# Patient Record
Sex: FEMALE | NOT HISPANIC OR LATINO | URBAN - METROPOLITAN AREA
[De-identification: names, ages, dates, MRNs, and addresses within clinical notes are randomized per-mention and may not be internally consistent; named-entity substitution may affect disease eponyms.]

---

## 2019-05-15 ENCOUNTER — HOSPITAL ENCOUNTER (OUTPATIENT)
Dept: URGENT CARE | Facility: CLINIC | Age: 24
Discharge: HOME | End: 2019-05-15
Attending: FAMILY MEDICINE

## 2019-05-17 LAB — BACTERIA SPEC AEROBE CULT: NORMAL

## 2020-10-27 ENCOUNTER — HOSPITAL ENCOUNTER (OUTPATIENT)
Dept: URGENT CARE | Facility: CLINIC | Age: 25
Discharge: HOME | End: 2020-10-27
Attending: PHYSICIAN ASSISTANT

## 2024-02-06 ENCOUNTER — OFFICE VISIT (OUTPATIENT)
Dept: OBSTETRICS AND GYNECOLOGY | Facility: CLINIC | Age: 29
End: 2024-02-06
Payer: COMMERCIAL

## 2024-02-06 ENCOUNTER — PATIENT ROUNDING (BHMG ONLY) (OUTPATIENT)
Dept: OBSTETRICS AND GYNECOLOGY | Facility: CLINIC | Age: 29
End: 2024-02-06
Payer: COMMERCIAL

## 2024-02-06 VITALS
HEIGHT: 65 IN | SYSTOLIC BLOOD PRESSURE: 130 MMHG | DIASTOLIC BLOOD PRESSURE: 89 MMHG | BODY MASS INDEX: 28.82 KG/M2 | HEART RATE: 72 BPM | WEIGHT: 173 LBS

## 2024-02-06 DIAGNOSIS — Z30.09 BIRTH CONTROL COUNSELING: ICD-10-CM

## 2024-02-06 DIAGNOSIS — Z01.419 WELL WOMAN EXAM: Primary | ICD-10-CM

## 2024-02-06 PROCEDURE — G0123 SCREEN CERV/VAG THIN LAYER: HCPCS

## 2024-02-06 PROCEDURE — 99385 PREV VISIT NEW AGE 18-39: CPT | Performed by: OBSTETRICS & GYNECOLOGY

## 2024-02-06 NOTE — PROGRESS NOTES
"Well Woman Visit    CC: LOLA     HPI:   28 y.o. who presents for a well woman exam.  She reports that her menses is regular.  At times can be heavier.  Last 5 to 7 days.  She was using a ParaGard IUD until recently.  She states that she was concerned that the IUD may have been dislodged which is why she originally scheduled the appointment.  She reports within a week of scheduling her appointment the IUD was expelled.  She had had the IUD in place for 8 years prior to this.  She is interested in having another ParaGard IUD placed.  No other problems or concerns.    History: PMHx, Meds, Allergies, PSHx, Social Hx, and POBHx all reviewed and updated.    /89   Pulse 72   Ht 165.1 cm (65\")   Wt 78.5 kg (173 lb)   LMP 2024 Comment: Moderate flow, lasting about 4 days  Breastfeeding No   BMI 28.79 kg/m²     Physical Exam  Vitals and nursing note reviewed. Exam conducted with a chaperone present.   Constitutional:       General: She is not in acute distress.     Appearance: Normal appearance. She is not ill-appearing.   HENT:      Head: Normocephalic and atraumatic.      Mouth/Throat:      Mouth: Mucous membranes are moist.      Pharynx: Oropharynx is clear.   Neck:      Thyroid: No thyroid mass or thyromegaly.   Chest:   Breasts:     Breasts are symmetrical.      Right: Normal. No swelling, bleeding, inverted nipple, mass, nipple discharge, skin change or tenderness.      Left: Normal. No swelling, bleeding, inverted nipple, mass, nipple discharge, skin change or tenderness.   Abdominal:      General: Abdomen is flat. There is no distension.      Palpations: Abdomen is soft. There is no mass.      Tenderness: There is no abdominal tenderness. There is no guarding or rebound.      Hernia: There is no hernia in the left inguinal area or right inguinal area.   Genitourinary:     General: Normal vulva.      Exam position: Lithotomy position.      Pubic Area: No rash.       Labia:         Right: No " rash, tenderness, lesion or injury.         Left: No rash, tenderness, lesion or injury.       Urethra: No prolapse, urethral pain or urethral lesion.      Vagina: Normal. No vaginal discharge, erythema, tenderness, bleeding or prolapsed vaginal walls.      Cervix: No cervical motion tenderness, lesion, erythema or cervical bleeding.      Uterus: Normal. Not enlarged, not fixed and not tender.       Adnexa:         Right: No mass or tenderness.          Left: No mass or tenderness.        Comments: The patient's IUD strings are not visible  Musculoskeletal:         General: No swelling.      Right lower leg: No edema.      Left lower leg: No edema.   Lymphadenopathy:      Upper Body:      Right upper body: No supraclavicular or axillary adenopathy.      Left upper body: No supraclavicular or axillary adenopathy.   Skin:     General: Skin is warm and dry.   Neurological:      Mental Status: She is alert and oriented to person, place, and time.   Psychiatric:         Mood and Affect: Mood normal.         Behavior: Behavior normal.         Thought Content: Thought content normal.         ASSESSMENT AND PLAN:   Diagnoses and all orders for this visit:    1. Well woman exam (Primary)  Assessment & Plan:  Pap  Recommend daily multivitamin with folic acid    Orders:  -     IGP,rfx Aptima HPV All Pth; Future  -     IGP,rfx Aptima HPV All Pth    2. Birth control counseling  Assessment & Plan:  The risk, benefits, alternatives of    IUD have been discussed.  We have discussed that since she has expelled an IUD previously, she may be at increased risk for further expulsions.  The patient wants to try the ParaGard once again.  I have recommended abstinence at least 2 weeks prior to the insertion and condom use prior to this.  She will need a beta-hCG today prior to insertion.  I have also recommended ibuprofen and/or Tylenol 30 minutes prior to the IUD insertion.    Orders:  -     hCG, Quantitative, Pregnancy;  Future        Counseling:     All BC Options R/B/A/SE/E of each reviewed  OCP/Hormone use risk FRANK  Track menses, RTO IF <q21d, >7d long, or heavy    Preventative:   s/p FLU vaccine this season  Recommend COVID vaccine, R/B discussed    She understands the importance of having any ordered tests to be performed in a timely fashion.  The risks of not performing them include, but are not limited to, advanced cancer stages, bone loss from osteoporosis and/or subsequent increase in morbidity and/or mortality.  She is encouraged to review her results online and/or contact or office if she has questions.     Follow Up:  Return for paragard IUD insert.    Pedrito Corea MD  02/06/2024

## 2024-02-06 NOTE — ASSESSMENT & PLAN NOTE
The risk, benefits, alternatives of    IUD have been discussed.  We have discussed that since she has expelled an IUD previously, she may be at increased risk for further expulsions.  The patient wants to try the ParaGard once again.  I have recommended abstinence at least 2 weeks prior to the insertion and condom use prior to this.  She will need a beta-hCG today prior to insertion.  I have also recommended ibuprofen and/or Tylenol 30 minutes prior to the IUD insertion.

## 2024-02-09 LAB
CONV .: NORMAL
CYTOLOGIST CVX/VAG CYTO: NORMAL
CYTOLOGY CVX/VAG DOC CYTO: NORMAL
CYTOLOGY CVX/VAG DOC THIN PREP: NORMAL
DX ICD CODE: NORMAL
HIV 1 & 2 AB SER-IMP: NORMAL
OTHER STN SPEC: NORMAL
STAT OF ADQ CVX/VAG CYTO-IMP: NORMAL

## 2024-02-12 ENCOUNTER — TELEPHONE (OUTPATIENT)
Dept: OBSTETRICS AND GYNECOLOGY | Facility: CLINIC | Age: 29
End: 2024-02-12

## 2024-02-12 NOTE — TELEPHONE ENCOUNTER
Caller: Hawa Henley    Relationship to patient: Self    Best call back number: [phone]    Patient is needing: PATIENT CALLED TO CHECK STATUS OF INSURANCE APPROVAL FOR PARAGAURD

## 2024-02-12 NOTE — TELEPHONE ENCOUNTER
Spoke with patient and she is aware that it can take up to 2 weeks for us to hear back from [place]. If she does not hear from us after the first of next week she will call the office to check on status again.

## 2024-02-28 NOTE — TELEPHONE ENCOUNTER
Penny has not received a determination yet from [facility] for this pt's Nexplanon pre-certification.  To my knowledge, she has re-faxed the request today.  The patient wants to know if you can give her an Rx for an OCP until we are able to get her scheduled for this.  Please advise. Thanks.

## 2024-02-28 NOTE — TELEPHONE ENCOUNTER
Provider: DR MATIAS    Caller: APRIL [facility]    Pharmacy: [facility] - CONFIRMED    Phone Number: [phone] / LVM    Reason for Call: PT CALLED TO CHECK STATUS OF THE PARAGAURD - STILL NO APPROVAL - PT IS WANTING TO KNOW IF SHE CAN BE PUT ON B/C PILL UNTIL THE APPROVAL     PLEASE CALL THE PT TO LET HER KNOW    THANK YOU!

## 2024-03-04 ENCOUNTER — TELEPHONE (OUTPATIENT)
Dept: OBSTETRICS AND GYNECOLOGY | Facility: CLINIC | Age: 29
End: 2024-03-04
Payer: COMMERCIAL

## 2024-04-02 ENCOUNTER — LAB (OUTPATIENT)
Dept: OBSTETRICS AND GYNECOLOGY | Facility: CLINIC | Age: 29
End: 2024-04-02
Payer: COMMERCIAL

## 2024-04-02 DIAGNOSIS — Z30.09 BIRTH CONTROL COUNSELING: ICD-10-CM

## 2024-04-02 LAB — HCG INTACT+B SERPL-ACNC: <1 MIU/ML

## 2024-04-02 PROCEDURE — 84702 CHORIONIC GONADOTROPIN TEST: CPT | Performed by: OBSTETRICS & GYNECOLOGY

## 2024-04-02 RX ORDER — COPPER 313.4 MG/1
INTRAUTERINE DEVICE INTRAUTERINE ONCE
Status: CANCELLED | OUTPATIENT
Start: 2024-04-03

## 2024-04-03 ENCOUNTER — PROCEDURE VISIT (OUTPATIENT)
Dept: OBSTETRICS AND GYNECOLOGY | Facility: CLINIC | Age: 29
End: 2024-04-03
Payer: COMMERCIAL

## 2024-04-03 VITALS
DIASTOLIC BLOOD PRESSURE: 93 MMHG | BODY MASS INDEX: 28.82 KG/M2 | WEIGHT: 173 LBS | HEIGHT: 65 IN | HEART RATE: 73 BPM | SYSTOLIC BLOOD PRESSURE: 137 MMHG

## 2024-04-03 DIAGNOSIS — Z30.430 ENCOUNTER FOR IUD INSERTION: Primary | ICD-10-CM

## 2024-04-03 RX ORDER — COPPER 313.4 MG/1
1 INTRAUTERINE DEVICE INTRAUTERINE ONCE
Status: COMPLETED | OUTPATIENT
Start: 2024-04-03 | End: 2024-04-03

## 2024-04-03 RX ORDER — COPPER 313.4 MG/1
1 INTRAUTERINE DEVICE INTRAUTERINE ONCE
COMMUNITY
Start: 2024-02-28 | End: 2024-04-03

## 2024-04-03 RX ADMIN — COPPER 1 EACH: 313.4 INTRAUTERINE DEVICE INTRAUTERINE at 10:09

## 2024-04-03 NOTE — PROGRESS NOTES
Procedures  IUD Insertion Procedure Note    Indication: Desires long acting reversible contraception     Procedure Details   Serum pregnancy test was done and was NEGATIVE .  The risks (including infection, bleeding, pain, and uterine perforation) and benefits of the procedure were explained to the patient and Written informed consent was obtained.      Cervix cleansed with Betadine. Uterus sounded to 9 cm. IUD inserted without difficulty. String visible and trimmed.    IUD Information:  ParaGard.    Condition:  Stable    Complications:  None    Patient tolerated the procedure well without complications.    Plan:  The patient was advised to call for any fever or for prolonged or severe pain or bleeding. She was advised to use OTC acetaminophen and OTC ibuprofen as needed for mild to moderate pain.     Pedrito Corea MD  4/3/2024  10:00 EDT

## 2024-05-15 ENCOUNTER — OFFICE VISIT (OUTPATIENT)
Dept: OBSTETRICS AND GYNECOLOGY | Facility: CLINIC | Age: 29
End: 2024-05-15
Payer: COMMERCIAL

## 2024-05-15 VITALS
WEIGHT: 176 LBS | HEART RATE: 79 BPM | BODY MASS INDEX: 29.32 KG/M2 | SYSTOLIC BLOOD PRESSURE: 136 MMHG | HEIGHT: 65 IN | DIASTOLIC BLOOD PRESSURE: 78 MMHG

## 2024-05-15 DIAGNOSIS — Z30.431 IUD CHECK UP: Primary | ICD-10-CM

## 2024-05-15 PROBLEM — Z01.419 WELL WOMAN EXAM: Status: RESOLVED | Noted: 2024-02-06 | Resolved: 2024-05-15

## 2024-05-15 PROBLEM — Z97.5 IUD CONTRACEPTION: Status: ACTIVE | Noted: 2024-05-15

## 2024-05-15 PROBLEM — Z30.09 BIRTH CONTROL COUNSELING: Status: RESOLVED | Noted: 2024-02-06 | Resolved: 2024-05-15

## 2024-05-15 PROCEDURE — 99213 OFFICE O/P EST LOW 20 MIN: CPT | Performed by: OBSTETRICS & GYNECOLOGY

## 2024-05-15 NOTE — ASSESSMENT & PLAN NOTE
Retained ParaGard IUD with appropriate IUD string length.  Continue IUD use.  Return for any concerns such as heavy bleeding, irregular bleeding, or pelvic pain.  Recommend daily prenatal vitamin with folic acid  Recommend OTC Tylenol and or ibuprofen for any further pelvic cramping related to IUD insertion.

## 2024-05-15 NOTE — PROGRESS NOTES
"[facility]  Post IUD Placement Visit    CC: IUD Check Up    Subjective:  Pelvic pain/cramping:  No  Vaginal bleeding:  No  Pain with sex: No  Feels strings easily:  Not tried  No problems or concerns    Objective: /78   Pulse 79   Ht 165.1 cm (65\")   Wt 79.8 kg (176 lb)   LMP 04/12/2024   Breastfeeding No   BMI 29.29 kg/m²     Physical Exam  Vitals and nursing note reviewed. Exam conducted with a chaperone present.   Constitutional:       General: She is not in acute distress.     Appearance: Normal appearance. She is not ill-appearing.   HENT:      Head: Normocephalic and atraumatic.   Genitourinary:     General: Normal vulva.      Exam position: Lithotomy position.      Labia:         Right: No rash, tenderness or lesion.         Left: No rash, tenderness or lesion.       Vagina: No signs of injury and foreign body. No vaginal discharge, erythema, tenderness, bleeding or prolapsed vaginal walls.      Cervix: No cervical motion tenderness, discharge, friability, lesion, erythema or cervical bleeding.      Uterus: Not deviated, not enlarged, not fixed and not tender.       Comments: The IUD string was 2 cm in length  Musculoskeletal:         General: No swelling.      Right lower leg: No edema.      Left lower leg: No edema.   Skin:     General: Skin is warm and dry.      Findings: No rash.   Neurological:      Mental Status: She is alert and oriented to person, place, and time.   Psychiatric:         Mood and Affect: Mood normal.         Behavior: Behavior normal.         Thought Content: Thought content normal.         Judgment: Judgment normal.       Assessment and Plan:    Diagnoses and all orders for this visit:    1. IUD check up (Primary)  Assessment & Plan:  Retained ParaGard IUD with appropriate IUD string length.  Continue IUD use.  Return for any concerns such as heavy bleeding, irregular bleeding, or pelvic pain.  Recommend daily prenatal vitamin with folic acid  Recommend " OTC Tylenol and or ibuprofen for any further pelvic cramping related to IUD insertion.          Counseling:  Pt was counseled to perform monthly string checks. Keep track of menses.    RTO if <q21d, >7d long, or heavy or if positive pregnancy test.    Follow Up:  Return for Annual physical.    Pedrito Corea MD  05/15/2024

## 2025-05-27 ENCOUNTER — HOSPITAL ENCOUNTER (EMERGENCY)
Facility: HOSPITAL | Age: 30
Discharge: HOME | End: 2025-05-27
Attending: EMERGENCY MEDICINE

## 2025-05-27 ENCOUNTER — APPOINTMENT (OUTPATIENT)
Dept: CT IMAGING | Facility: HOSPITAL | Age: 30
End: 2025-05-27

## 2025-05-27 VITALS
SYSTOLIC BLOOD PRESSURE: 124 MMHG | RESPIRATION RATE: 20 BRPM | DIASTOLIC BLOOD PRESSURE: 68 MMHG | WEIGHT: 192.24 LBS | OXYGEN SATURATION: 97 % | BODY MASS INDEX: 32.03 KG/M2 | HEIGHT: 65 IN | HEART RATE: 78 BPM | TEMPERATURE: 97.9 F

## 2025-05-27 DIAGNOSIS — L05.01 PILONIDAL CYST WITH ABSCESS: Primary | ICD-10-CM

## 2025-05-27 LAB
BASOPHILS # BLD AUTO: 0.07 10*3/MM3 (ref 0–0.2)
BASOPHILS NFR BLD AUTO: 0.6 % (ref 0–1.5)
DEPRECATED RDW RBC AUTO: 41.5 FL (ref 37–54)
EOSINOPHIL # BLD AUTO: 0.1 10*3/MM3 (ref 0–0.4)
EOSINOPHIL NFR BLD AUTO: 0.8 % (ref 0.3–6.2)
ERYTHROCYTE [DISTWIDTH] IN BLOOD BY AUTOMATED COUNT: 12.6 % (ref 12.3–15.4)
HCG INTACT+B SERPL-ACNC: <0.5 MIU/ML
HCT VFR BLD AUTO: 40.6 % (ref 34–46.6)
HGB BLD-MCNC: 13.4 G/DL (ref 12–15.9)
HOLD SPECIMEN: NORMAL
HOLD SPECIMEN: NORMAL
IMM GRANULOCYTES # BLD AUTO: 0.05 10*3/MM3 (ref 0–0.05)
IMM GRANULOCYTES NFR BLD AUTO: 0.4 % (ref 0–0.5)
LYMPHOCYTES # BLD AUTO: 1.87 10*3/MM3 (ref 0.7–3.1)
LYMPHOCYTES NFR BLD AUTO: 15.1 % (ref 19.6–45.3)
MCH RBC QN AUTO: 29.6 PG (ref 26.6–33)
MCHC RBC AUTO-ENTMCNC: 33 G/DL (ref 31.5–35.7)
MCV RBC AUTO: 89.6 FL (ref 79–97)
MONOCYTES # BLD AUTO: 0.91 10*3/MM3 (ref 0.1–0.9)
MONOCYTES NFR BLD AUTO: 7.3 % (ref 5–12)
NEUTROPHILS NFR BLD AUTO: 75.8 % (ref 42.7–76)
NEUTROPHILS NFR BLD AUTO: 9.4 10*3/MM3 (ref 1.7–7)
NRBC BLD AUTO-RTO: 0 /100 WBC (ref 0–0.2)
PLATELET # BLD AUTO: 290 10*3/MM3 (ref 140–450)
PMV BLD AUTO: 9.8 FL (ref 6–12)
RBC # BLD AUTO: 4.53 10*6/MM3 (ref 3.77–5.28)
WBC NRBC COR # BLD AUTO: 12.4 10*3/MM3 (ref 3.4–10.8)

## 2025-05-27 PROCEDURE — 99285 EMERGENCY DEPT VISIT HI MDM: CPT

## 2025-05-27 PROCEDURE — 84702 CHORIONIC GONADOTROPIN TEST: CPT | Performed by: EMERGENCY MEDICINE

## 2025-05-27 PROCEDURE — 25010000002 LIDOCAINE 1 % SOLUTION

## 2025-05-27 PROCEDURE — 25510000001 IOPAMIDOL PER 1 ML: Performed by: EMERGENCY MEDICINE

## 2025-05-27 PROCEDURE — 87205 SMEAR GRAM STAIN: CPT

## 2025-05-27 PROCEDURE — 25010000002 HYDROMORPHONE 1 MG/ML SOLUTION

## 2025-05-27 PROCEDURE — 85025 COMPLETE CBC W/AUTO DIFF WBC: CPT

## 2025-05-27 PROCEDURE — 96374 THER/PROPH/DIAG INJ IV PUSH: CPT

## 2025-05-27 PROCEDURE — 72193 CT PELVIS W/DYE: CPT

## 2025-05-27 PROCEDURE — 87070 CULTURE OTHR SPECIMN AEROBIC: CPT

## 2025-05-27 RX ORDER — HYDROCODONE BITARTRATE AND ACETAMINOPHEN 7.5; 325 MG/1; MG/1
1 TABLET ORAL EVERY 6 HOURS PRN
Qty: 12 TABLET | Refills: 0 | Status: SHIPPED | OUTPATIENT
Start: 2025-05-27

## 2025-05-27 RX ORDER — LIDOCAINE HYDROCHLORIDE 10 MG/ML
10 INJECTION, SOLUTION INFILTRATION; PERINEURAL ONCE
Status: COMPLETED | OUTPATIENT
Start: 2025-05-27 | End: 2025-05-27

## 2025-05-27 RX ORDER — HYDROCODONE BITARTRATE AND ACETAMINOPHEN 7.5; 325 MG/1; MG/1
1 TABLET ORAL ONCE
Refills: 0 | Status: COMPLETED | OUTPATIENT
Start: 2025-05-27 | End: 2025-05-27

## 2025-05-27 RX ORDER — MUPIROCIN 20 MG/G
1 OINTMENT TOPICAL 3 TIMES DAILY
Qty: 21 G | Refills: 0 | Status: SHIPPED | OUTPATIENT
Start: 2025-05-27 | End: 2025-06-03

## 2025-05-27 RX ORDER — IOPAMIDOL 755 MG/ML
100 INJECTION, SOLUTION INTRAVASCULAR
Status: COMPLETED | OUTPATIENT
Start: 2025-05-27 | End: 2025-05-27

## 2025-05-27 RX ORDER — SULFAMETHOXAZOLE AND TRIMETHOPRIM 800; 160 MG/1; MG/1
1 TABLET ORAL 2 TIMES DAILY
Qty: 14 TABLET | Refills: 0 | Status: SHIPPED | OUTPATIENT
Start: 2025-05-27 | End: 2025-06-03

## 2025-05-27 RX ADMIN — HYDROCODONE BITARTRATE AND ACETAMINOPHEN 1 TABLET: 7.5; 325 TABLET ORAL at 13:50

## 2025-05-27 RX ADMIN — IOPAMIDOL 95 ML: 755 INJECTION, SOLUTION INTRAVENOUS at 15:01

## 2025-05-27 RX ADMIN — CEPHALEXIN 500 MG: 250 CAPSULE ORAL at 13:51

## 2025-05-27 RX ADMIN — HYDROMORPHONE HYDROCHLORIDE 1 MG: 1 INJECTION, SOLUTION INTRAMUSCULAR; INTRAVENOUS; SUBCUTANEOUS at 16:13

## 2025-05-27 RX ADMIN — LIDOCAINE HYDROCHLORIDE 10 ML: 10 INJECTION, SOLUTION INFILTRATION; PERINEURAL at 13:51

## 2025-05-27 NOTE — ED PROVIDER NOTES
Time: 4:39 PM EDT  Date of encounter:  2025  Independent Historian/Clinical History and Information was obtained by:   Patient    History is limited by: N/A    Chief Complaint: Abscess      History of Present Illness:  Patient is a 30 y.o. year old female who presents to the emergency department for evaluation of abscess to tailbone.  Patient reports she has a cyst or abscess over her tailbone region that started 3 days ago. Patient has a history of Pilonidal cysts.       Patient Care Team  Primary Care Provider: Provider, Maricel Known    Past Medical History:     No Known Allergies  Past Medical History:   Diagnosis Date    Cyst near tailbone      Past Surgical History:   Procedure Laterality Date     SECTION  2016     Family History   Problem Relation Age of Onset    Ovarian cancer Neg Hx     Uterine cancer Neg Hx     Breast cancer Neg Hx     Prostate cancer Neg Hx     Colon cancer Neg Hx     Clotting disorder Neg Hx     Deep vein thrombosis Neg Hx     Pulmonary embolism Neg Hx        Home Medications:  Prior to Admission medications    Medication Sig Start Date End Date Taking? Authorizing Provider   mupirocin (BACTROBAN) 2 % ointment Apply 1 Application topically to the appropriate area as directed 3 (Three) Times a Day for 7 days. 5/27/25 6/3/25  Natalia Kelley APRN   ondansetron ODT (ZOFRAN-ODT) 4 MG disintegrating tablet Place 1 tablet on the tongue 4 (Four) Times a Day As Needed for Nausea. 3/4/25   Kike Minor MD   PARAGARD INTRAUTERINE COPPER IU by Intrauterine route.    Provider, MD Aniya   sulfamethoxazole-trimethoprim (BACTRIM DS,SEPTRA DS) 800-160 MG per tablet Take 1 tablet by mouth 2 (Two) Times a Day for 7 days. 5/27/25 6/3/25  Natalia Kelley APRN        Social History:   Social History     Tobacco Use    Smoking status: Never     Passive exposure: Never    Smokeless tobacco: Never   Vaping Use    Vaping status: Every Day    Substances: Nicotine, Flavoring    Devices:  "Disposable   Substance Use Topics    Alcohol use: Yes     Comment: OCC    Drug use: Never         Review of Systems:  Review of Systems   Constitutional:  Negative for chills and fever.   HENT:  Negative for congestion, rhinorrhea and sore throat.    Eyes:  Negative for pain and visual disturbance.   Respiratory:  Negative for apnea, cough, chest tightness and shortness of breath.    Cardiovascular:  Negative for chest pain and palpitations.   Gastrointestinal:  Negative for abdominal pain, diarrhea, nausea and vomiting.   Genitourinary:  Negative for difficulty urinating and dysuria.   Musculoskeletal:  Negative for joint swelling and myalgias.   Skin:  Positive for color change.   Neurological:  Negative for seizures and headaches.   Psychiatric/Behavioral: Negative.     All other systems reviewed and are negative.       Physical Exam:  /98 (BP Location: Right arm, Patient Position: Sitting)   Pulse 116   Temp 98.6 °F (37 °C) (Oral)   Resp 19   Ht 165.1 cm (65\")   Wt 87.2 kg (192 lb 3.9 oz)   LMP  (LMP Unknown)   SpO2 100%   BMI 31.99 kg/m²     Physical Exam  Vitals and nursing note reviewed.   Constitutional:       General: She is not in acute distress.     Appearance: Normal appearance. She is not toxic-appearing.   HENT:      Head: Normocephalic and atraumatic.      Jaw: There is normal jaw occlusion.   Eyes:      General: Lids are normal.      Extraocular Movements: Extraocular movements intact.      Conjunctiva/sclera: Conjunctivae normal.      Pupils: Pupils are equal, round, and reactive to light.   Cardiovascular:      Rate and Rhythm: Normal rate and regular rhythm.      Pulses: Normal pulses.      Heart sounds: Normal heart sounds.   Pulmonary:      Effort: Pulmonary effort is normal. No respiratory distress.      Breath sounds: Normal breath sounds. No wheezing or rhonchi.   Abdominal:      General: Abdomen is flat.      Palpations: Abdomen is soft.      Tenderness: There is no abdominal " tenderness. There is no guarding or rebound.   Musculoskeletal:         General: Normal range of motion.      Cervical back: Normal range of motion and neck supple.      Right lower leg: No edema.      Left lower leg: No edema.   Skin:     General: Skin is warm and dry.      Findings: Erythema present.          Neurological:      Mental Status: She is alert and oriented to person, place, and time. Mental status is at baseline.   Psychiatric:         Mood and Affect: Mood normal.                    Medical Decision Making:      Comorbidities that affect care:    None    External Notes reviewed:    None      The following orders were placed and all results were independently analyzed by me:  Orders Placed This Encounter   Procedures    Incision & Drainage    Wound Culture - Wound, Buttock    CT Pelvis With Contrast    CBC Auto Differential    hCG, Quantitative, Pregnancy    I and D tray, wound CX  Nursing Communication    CBC & Differential    Extra Tubes    Green Top (Gel)    Suggs Top       Medications Given in the Emergency Department:  Medications   lidocaine (XYLOCAINE) 1 % injection 10 mL (10 mL Infiltration Given 5/27/25 1351)   cephalexin (KEFLEX) capsule 500 mg (500 mg Oral Given 5/27/25 1351)   HYDROcodone-acetaminophen (NORCO) 7.5-325 MG per tablet 1 tablet (1 tablet Oral Given 5/27/25 1350)   iopamidol (ISOVUE-370) 76 % injection 100 mL (95 mL Intravenous Given 5/27/25 1501)   HYDROmorphone (DILAUDID) injection 1 mg (1 mg Intravenous Given 5/27/25 1613)        ED Course:         Labs:    Lab Results (last 24 hours)       Procedure Component Value Units Date/Time    CBC & Differential [[id number]]  (Abnormal) Collected: 05/27/25 1421    Specimen: Blood Updated: 05/27/25 1431    Narrative:      The following orders were created for panel order CBC & Differential.  Procedure                               Abnormality         Status                     ---------                               -----------          ------                     CBC Auto Differential[[id number]]        Abnormal            Final result                 Please view results for these tests on the individual orders.    CBC Auto Differential [[id number]]  (Abnormal) Collected: 05/27/25 1421    Specimen: Blood Updated: 05/27/25 1431     WBC 12.40 10*3/mm3      RBC 4.53 10*6/mm3      Hemoglobin 13.4 g/dL      Hematocrit 40.6 %      MCV 89.6 fL      MCH 29.6 pg      MCHC 33.0 g/dL      RDW 12.6 %      RDW-SD 41.5 fl      MPV 9.8 fL      Platelets 290 10*3/mm3      Neutrophil % 75.8 %      Lymphocyte % 15.1 %      Monocyte % 7.3 %      Eosinophil % 0.8 %      Basophil % 0.6 %      Immature Grans % 0.4 %      Neutrophils, Absolute 9.40 10*3/mm3      Lymphocytes, Absolute 1.87 10*3/mm3      Monocytes, Absolute 0.91 10*3/mm3      Eosinophils, Absolute 0.10 10*3/mm3      Basophils, Absolute 0.07 10*3/mm3      Immature Grans, Absolute 0.05 10*3/mm3      nRBC 0.0 /100 WBC     hCG, Quantitative, Pregnancy [[id number]] Collected: 05/27/25 1421    Specimen: Blood Updated: 05/27/25 1446     HCG Quantitative <0.50 mIU/mL     Narrative:      HCG Ranges by Gestational Age    Females - non-pregnant premenopausal   </= 1mIU/mL HCG  Females - postmenopausal               </= 7mIU/mL HCG    3 Weeks       5.4   -      72 mIU/mL  4 Weeks      10.2   -     708 mIU/mL  5 Weeks       217   -   8,245 mIU/mL  6 Weeks       152   -  32,177 mIU/mL  7 Weeks     4,059   - 153,767 mIU/mL  8 Weeks    31,366   - 149,094 mIU/mL  9 Weeks    59,109   - 135,901 mIU/mL  10 Weeks   44,186   - 170,409 mIU/mL  12 Weeks   27,107   - 201,615 mIU/mL  14 Weeks   24,302   -  93,646 mIU/mL  15 Weeks   12,540   -  69,747 mIU/mL  16 Weeks    8,904   -  55,332 mIU/mL  17 Weeks    8,240   -  51,793 mIU/mL  18 Weeks    9,649   -  55,271 mIU/mL               Imaging:    CT Pelvis With Contrast  Result Date: 5/27/2025  CT PELVIS W CONTRAST Date of Exam: 5/27/2025 3:00 PM EDT Indication: Pilonidal cyst.  Comparison: None available. Technique: Axial CT images were obtained of the pelvis after the uneventful intravenous administration of iodinated contrast.  Reconstructed coronal and sagittal images were also obtained. Automated exposure control and iterative construction methods were used. Findings: There is a rim-enhancing fluid collection located along the upper buttock crease measuring 1.9 x 1.5 cm in diameter on image 49 of series 2. It measures approximately 2.4 cm in craniocaudal height on image 89 of series 4. The wall is thickened and there is enhancement in the surrounding subcutaneous fat raising suspicion of a infected pilonidal cyst. There is no extension of the inflammatory changes into the ischial anal fat. There is an intrauterine device in the pelvis which is in good position. The urinary bladder is unremarkable. There are no enlarged pelvic lymph nodes. There is a tiny fat density umbilical hernia. There is normal vascular enhancement. There are no suspicious osteolytic or sclerotic lesions within the bony structures.     Impression: 1.There is a rim-enhancing fluid collection located along the upper buttock crease measuring 1.9 x 1.5 cm in diameter. The wall is thickened and there is enhancement in the surrounding subcutaneous fat raising suspicion of a infected pilonidal cyst. 2.Tiny fat density umbilical hernia. Electronically Signed: Khoa Stephens MD  5/27/2025 3:25 PM EDT  Workstation ID: [id number]        Differential Diagnosis and Discussion:    Abscess: Differential diagnosis for an abscess includes but is not limited to bacterial or fungal infections, foreign body reactions, malignancies, and autoimmune or inflammatory conditions.    PROCEDURES:    Labs were collected in the emergency department and all labs were reviewed and interpreted by me.  CT scan was performed in the emergency department and the CT scan radiology impression was interpreted by me.    No orders to display       Incision &  Drainage    Date/Time: 5/27/2025 4:42 PM    Performed by: Natalia Kelley APRN  Authorized by: Nikolas Andrea MD    Consent:     Consent obtained:  Verbal    Consent given by:  Patient    Risks, benefits, and alternatives were discussed: yes      Risks discussed:  Bleeding, incomplete drainage, infection, damage to other organs and pain    Alternatives discussed:  No treatment  Universal protocol:     Procedure explained and questions answered to patient or proxy's satisfaction: yes      Relevant documents present and verified: yes      Test results available : yes      Imaging studies available: yes      Required blood products, implants, devices, and special equipment available: yes      Site/side marked: yes      Immediately prior to procedure, a time out was called: yes      Patient identity confirmed:  Verbally with patient, arm band, provided demographic data and hospital-assigned identification number  Location:     Type:  Pilonidal cyst    Location:  Lower extremity    Lower extremity location:  Buttock    Buttock location:  R buttock  Pre-procedure details:     Skin preparation:  Chlorhexidine  Sedation:     Sedation type:  None  Anesthesia:     Anesthesia method:  Local infiltration    Local anesthetic:  Lidocaine 1% w/o epi  Procedure details:     Incision types:  Stab incision    Incision depth:  Subcutaneous    Wound management:  Probed and deloculated and irrigated with saline    Drainage:  Serosanguinous, purulent and bloody    Drainage amount:  Copious    Wound treatment:  Wound left open    Packing materials:  None  Post-procedure details:     Procedure completion:  Tolerated well, no immediate complications      MDM     Amount and/or Complexity of Data Reviewed  Clinical lab tests: reviewed  Tests in the radiology section of CPT®: reviewed                       Patient Care Considerations:          Consultants/Shared Management Plan:    SHARED VISIT: I have discussed the case with my supervising  physician, Dr. Andrea who states he agrees with the current plan of care. The substantive portion of the medical decision was made by the attesting physician who made or approve the management plan and will take responsibility for the patient.  Clinical findings were discussed and ultimate disposition was made in consult with supervising physician.    Social Determinants of Health:    Patient is independent, reliable, and has access to care.       Disposition and Care Coordination:    Discharged: The patient is suitable and stable for discharge with no need for consideration of admission.    I have explained the patient´s condition, diagnoses and treatment plan based on the information available to me at this time. I have answered questions and addressed any concerns. The patient has a good  understanding of the patient´s diagnosis, condition, and treatment plan as can be expected at this point. The vital signs have been stable. The patient´s condition is stable and appropriate for discharge from the emergency department.      The patient will pursue further outpatient evaluation with the primary care physician or other designated or consulting physician as outlined in the discharge instructions. They are agreeable to this plan of care and follow-up instructions have been explained in detail. The patient has received these instructions in written format and has expressed an understanding of the discharge instructions. The patient is aware that any significant change in condition or worsening of symptoms should prompt an immediate return to this or the closest emergency department or call to 911.    Final diagnoses:   Pilonidal cyst with abscess        ED Disposition       ED Disposition   Discharge    Condition   Stable    Comment   --               This medical record created using voice recognition software.             Natalia Kelley, APRN  05/27/25 1642       Natalia Kelley, APRN  05/27/25 1645

## 2025-05-27 NOTE — ED PROVIDER NOTES
"SHARED VISIT ATTESTATION:    This visit was performed by myself and an APC.  I personally approved the management plan/medical decision making and take responsibility for the patient management.      SHARED VISIT NOTE:    Patient is 30 y.o. year old female that presents to the ED for evaluation of cyst.     Physical Exam    ED Course:    /98 (BP Location: Right arm, Patient Position: Sitting)   Pulse 116   Temp 98.6 °F (37 °C) (Oral)   Resp 19   Ht 165.1 cm (65\")   Wt 87.2 kg (192 lb 3.9 oz)   SpO2 100%   BMI 31.99 kg/m²       The following orders were placed and all results were independently analyzed by me:  Orders Placed This Encounter   Procedures    Wound Culture - Wound, Buttock    I and D tray, wound CX  Nursing Communication       Medications Given in the Emergency Department:  Medications   lidocaine (XYLOCAINE) 1 % injection 10 mL (has no administration in time range)        ED Course:         Labs:    Lab Results (last 24 hours)       ** No results found for the last 24 hours. **             Imaging:    No Radiology Exams Resulted Within Past 24 Hours    MDM:    Procedures                         Nikolas Andrea MD  13:40 EDT  05/27/25         Nikolas Andrea MD  05/27/25 1828    "

## 2025-05-27 NOTE — Clinical Note
[facility] EMERGENCY ROOM  [address] [place] [address]  Phone: [phone]  Fax: [phone]    April Level was seen and treated in our emergency department on 5/27/2025.  She may return to work on 05/30/2025.         Thank you for choosing [facility].    Natalia Kelley, APRN

## 2025-05-27 NOTE — Clinical Note
[facility] EMERGENCY ROOM  [address] [place] [address]  Phone: [phone]  Fax: [phone]    April Level was seen and treated in our emergency department on 5/27/2025.  She may return to work on 05/30/2025.         Thank you for choosing [facility].    Natalia Kelley, APRN

## 2025-05-29 LAB
BACTERIA SPEC AEROBE CULT: NORMAL
GRAM STN SPEC: NORMAL

## 2025-08-15 ENCOUNTER — HOSPITAL ENCOUNTER (EMERGENCY)
Facility: HOSPITAL | Age: 30
Discharge: HOME | End: 2025-08-15
Attending: EMERGENCY MEDICINE
Payer: MEDICAID

## 2025-08-15 VITALS
HEIGHT: 65 IN | TEMPERATURE: 99 F | DIASTOLIC BLOOD PRESSURE: 99 MMHG | HEART RATE: 94 BPM | SYSTOLIC BLOOD PRESSURE: 142 MMHG | RESPIRATION RATE: 18 BRPM | BODY MASS INDEX: 32.76 KG/M2 | OXYGEN SATURATION: 99 % | WEIGHT: 196.65 LBS

## 2025-08-15 DIAGNOSIS — K61.0 PERIANAL CELLULITIS: ICD-10-CM

## 2025-08-15 DIAGNOSIS — L05.91 PILONIDAL CYST: Primary | ICD-10-CM

## 2025-08-15 LAB
BASOPHILS # BLD AUTO: 0.08 10*3/MM3 (ref 0–0.2)
BASOPHILS NFR BLD AUTO: 0.7 % (ref 0–1.5)
DEPRECATED RDW RBC AUTO: 42.2 FL (ref 37–54)
EOSINOPHIL # BLD AUTO: 0.09 10*3/MM3 (ref 0–0.4)
EOSINOPHIL NFR BLD AUTO: 0.8 % (ref 0.3–6.2)
ERYTHROCYTE [DISTWIDTH] IN BLOOD BY AUTOMATED COUNT: 12.9 % (ref 12.3–15.4)
HCT VFR BLD AUTO: 40.1 % (ref 34–46.6)
HGB BLD-MCNC: 13.7 G/DL (ref 12–15.9)
IMM GRANULOCYTES # BLD AUTO: 0.04 10*3/MM3 (ref 0–0.05)
IMM GRANULOCYTES NFR BLD AUTO: 0.4 % (ref 0–0.5)
LYMPHOCYTES # BLD AUTO: 2.08 10*3/MM3 (ref 0.7–3.1)
LYMPHOCYTES NFR BLD AUTO: 18.5 % (ref 19.6–45.3)
MCH RBC QN AUTO: 30.6 PG (ref 26.6–33)
MCHC RBC AUTO-ENTMCNC: 34.2 G/DL (ref 31.5–35.7)
MCV RBC AUTO: 89.5 FL (ref 79–97)
MONOCYTES # BLD AUTO: 0.71 10*3/MM3 (ref 0.1–0.9)
MONOCYTES NFR BLD AUTO: 6.3 % (ref 5–12)
NEUTROPHILS NFR BLD AUTO: 73.3 % (ref 42.7–76)
NEUTROPHILS NFR BLD AUTO: 8.22 10*3/MM3 (ref 1.7–7)
NRBC BLD AUTO-RTO: 0 /100 WBC (ref 0–0.2)
PLATELET # BLD AUTO: 305 10*3/MM3 (ref 140–450)
PMV BLD AUTO: 9.5 FL (ref 6–12)
RBC # BLD AUTO: 4.48 10*6/MM3 (ref 3.77–5.28)
WBC NRBC COR # BLD AUTO: 11.22 10*3/MM3 (ref 3.4–10.8)

## 2025-08-15 PROCEDURE — 85025 COMPLETE CBC W/AUTO DIFF WBC: CPT

## 2025-08-15 PROCEDURE — 99283 EMERGENCY DEPT VISIT LOW MDM: CPT

## 2025-08-15 PROCEDURE — 36415 COLL VENOUS BLD VENIPUNCTURE: CPT

## 2025-08-15 RX ORDER — CEPHALEXIN 500 MG/1
500 CAPSULE ORAL 4 TIMES DAILY
Qty: 20 CAPSULE | Refills: 0 | Status: SHIPPED | OUTPATIENT
Start: 2025-08-15 | End: 2025-08-20

## 2025-08-15 RX ORDER — TRAMADOL HYDROCHLORIDE 50 MG/1
50 TABLET ORAL EVERY 6 HOURS PRN
Qty: 15 TABLET | Refills: 0 | Status: SHIPPED | OUTPATIENT
Start: 2025-08-15

## 2025-08-15 RX ORDER — TRAMADOL HYDROCHLORIDE 50 MG/1
50 TABLET ORAL ONCE
Status: COMPLETED | OUTPATIENT
Start: 2025-08-15 | End: 2025-08-15

## 2025-08-15 RX ADMIN — CEPHALEXIN 500 MG: 250 CAPSULE ORAL at 18:18

## 2025-08-15 RX ADMIN — TRAMADOL HYDROCHLORIDE 50 MG: 50 TABLET, COATED ORAL at 18:18
